# Patient Record
Sex: FEMALE | Race: WHITE | NOT HISPANIC OR LATINO | Employment: FULL TIME | URBAN - METROPOLITAN AREA
[De-identification: names, ages, dates, MRNs, and addresses within clinical notes are randomized per-mention and may not be internally consistent; named-entity substitution may affect disease eponyms.]

---

## 2024-04-01 ENCOUNTER — APPOINTMENT (OUTPATIENT)
Dept: PRIMARY CARE | Facility: CLINIC | Age: 54
End: 2024-04-01
Payer: COMMERCIAL

## 2024-04-21 ASSESSMENT — PROMIS GLOBAL HEALTH SCALE
RATE_QUALITY_OF_LIFE: VERY GOOD
CARRYOUT_PHYSICAL_ACTIVITIES: COMPLETELY
RATE_PHYSICAL_HEALTH: GOOD
RATE_MENTAL_HEALTH: VERY GOOD
RATE_AVERAGE_PAIN: 0
RATE_GENERAL_HEALTH: GOOD
RATE_SOCIAL_SATISFACTION: VERY GOOD
EMOTIONAL_PROBLEMS: NEVER
CARRYOUT_SOCIAL_ACTIVITIES: VERY GOOD

## 2024-04-22 ENCOUNTER — OFFICE VISIT (OUTPATIENT)
Dept: PRIMARY CARE | Facility: CLINIC | Age: 54
End: 2024-04-22
Payer: COMMERCIAL

## 2024-04-22 VITALS
HEART RATE: 88 BPM | HEIGHT: 67 IN | OXYGEN SATURATION: 97 % | DIASTOLIC BLOOD PRESSURE: 62 MMHG | BODY MASS INDEX: 30.29 KG/M2 | WEIGHT: 193 LBS | SYSTOLIC BLOOD PRESSURE: 108 MMHG

## 2024-04-22 DIAGNOSIS — Z13.29 THYROID DISORDER SCREENING: ICD-10-CM

## 2024-04-22 DIAGNOSIS — B35.1 ONYCHOMYCOSIS: ICD-10-CM

## 2024-04-22 DIAGNOSIS — Z11.3 SCREENING EXAMINATION FOR STI: ICD-10-CM

## 2024-04-22 DIAGNOSIS — Z11.59 NEED FOR HEPATITIS C SCREENING TEST: ICD-10-CM

## 2024-04-22 DIAGNOSIS — Z00.00 ANNUAL PHYSICAL EXAM: Primary | ICD-10-CM

## 2024-04-22 DIAGNOSIS — Z13.220 LIPID SCREENING: ICD-10-CM

## 2024-04-22 DIAGNOSIS — T75.3XXA MOTION SICKNESS, INITIAL ENCOUNTER: ICD-10-CM

## 2024-04-22 PROBLEM — G89.29 PAIN, FOOT, CHRONIC: Status: ACTIVE | Noted: 2024-04-22

## 2024-04-22 PROBLEM — E78.00 ELEVATED LOW DENSITY LIPOPROTEIN (LDL) CHOLESTEROL LEVEL: Status: ACTIVE | Noted: 2024-04-22

## 2024-04-22 PROBLEM — R92.30 DENSE BREAST: Status: ACTIVE | Noted: 2018-10-18

## 2024-04-22 PROBLEM — L72.3 SEBACEOUS CYST: Status: ACTIVE | Noted: 2024-04-22

## 2024-04-22 PROBLEM — U07.1 DISEASE DUE TO SEVERE ACUTE RESPIRATORY SYNDROME CORONAVIRUS 2 (SARS-COV-2): Status: ACTIVE | Noted: 2024-04-22

## 2024-04-22 PROBLEM — L91.8 SKIN TAG: Status: ACTIVE | Noted: 2024-04-22

## 2024-04-22 PROBLEM — M79.673 PAIN, FOOT, CHRONIC: Status: ACTIVE | Noted: 2024-04-22

## 2024-04-22 PROBLEM — R31.9 HEMATURIA: Status: RESOLVED | Noted: 2024-04-22 | Resolved: 2024-04-22

## 2024-04-22 PROBLEM — L81.1 CHLOASMA: Status: ACTIVE | Noted: 2024-04-22

## 2024-04-22 PROBLEM — R92.2 DENSE BREAST: Status: ACTIVE | Noted: 2018-10-18

## 2024-04-22 LAB
ALBUMIN SERPL BCP-MCNC: 4.4 G/DL (ref 3.4–5)
ALP SERPL-CCNC: 50 U/L (ref 33–110)
ALT SERPL W P-5'-P-CCNC: 19 U/L (ref 7–45)
ANION GAP SERPL CALC-SCNC: 12 MMOL/L (ref 10–20)
AST SERPL W P-5'-P-CCNC: 21 U/L (ref 9–39)
BILIRUB SERPL-MCNC: 0.4 MG/DL (ref 0–1.2)
BUN SERPL-MCNC: 15 MG/DL (ref 6–23)
CALCIUM SERPL-MCNC: 9.4 MG/DL (ref 8.6–10.6)
CHLORIDE SERPL-SCNC: 104 MMOL/L (ref 98–107)
CHOLEST SERPL-MCNC: 230 MG/DL (ref 0–199)
CHOLESTEROL/HDL RATIO: 2.7
CO2 SERPL-SCNC: 31 MMOL/L (ref 21–32)
CREAT SERPL-MCNC: 0.82 MG/DL (ref 0.5–1.05)
EGFRCR SERPLBLD CKD-EPI 2021: 85 ML/MIN/1.73M*2
ERYTHROCYTE [DISTWIDTH] IN BLOOD BY AUTOMATED COUNT: 13.2 % (ref 11.5–14.5)
EST. AVERAGE GLUCOSE BLD GHB EST-MCNC: 94 MG/DL
GLUCOSE SERPL-MCNC: 77 MG/DL (ref 74–99)
HBA1C MFR BLD: 4.9 %
HCT VFR BLD AUTO: 45.9 % (ref 36–46)
HCV AB SER QL: NONREACTIVE
HDLC SERPL-MCNC: 86.6 MG/DL
HERPES SIMPLEX VIRUS 1 IGG: 0.5 INDEX
HERPES SIMPLEX VIRUS 2 IGG: >8 INDEX
HGB BLD-MCNC: 14.4 G/DL (ref 12–16)
HIV 1+2 AB+HIV1 P24 AG SERPL QL IA: NONREACTIVE
LDLC SERPL CALC-MCNC: 123 MG/DL
MCH RBC QN AUTO: 31.4 PG (ref 26–34)
MCHC RBC AUTO-ENTMCNC: 31.4 G/DL (ref 32–36)
MCV RBC AUTO: 100 FL (ref 80–100)
NON HDL CHOLESTEROL: 143 MG/DL (ref 0–149)
NRBC BLD-RTO: 0 /100 WBCS (ref 0–0)
PLATELET # BLD AUTO: 278 X10*3/UL (ref 150–450)
POTASSIUM SERPL-SCNC: 4.6 MMOL/L (ref 3.5–5.3)
PROT SERPL-MCNC: 6.6 G/DL (ref 6.4–8.2)
RBC # BLD AUTO: 4.58 X10*6/UL (ref 4–5.2)
SODIUM SERPL-SCNC: 142 MMOL/L (ref 136–145)
TREPONEMA PALLIDUM IGG+IGM AB [PRESENCE] IN SERUM OR PLASMA BY IMMUNOASSAY: NONREACTIVE
TRIGL SERPL-MCNC: 102 MG/DL (ref 0–149)
TSH SERPL-ACNC: 2.66 MIU/L (ref 0.44–3.98)
VLDL: 20 MG/DL (ref 0–40)
WBC # BLD AUTO: 4.9 X10*3/UL (ref 4.4–11.3)

## 2024-04-22 PROCEDURE — 86695 HERPES SIMPLEX TYPE 1 TEST: CPT

## 2024-04-22 PROCEDURE — 80061 LIPID PANEL: CPT

## 2024-04-22 PROCEDURE — 85027 COMPLETE CBC AUTOMATED: CPT

## 2024-04-22 PROCEDURE — 87800 DETECT AGNT MULT DNA DIREC: CPT

## 2024-04-22 PROCEDURE — 87491 CHLMYD TRACH DNA AMP PROBE: CPT

## 2024-04-22 PROCEDURE — 80053 COMPREHEN METABOLIC PANEL: CPT

## 2024-04-22 PROCEDURE — 86780 TREPONEMA PALLIDUM: CPT

## 2024-04-22 PROCEDURE — 86696 HERPES SIMPLEX TYPE 2 TEST: CPT

## 2024-04-22 PROCEDURE — 84443 ASSAY THYROID STIM HORMONE: CPT

## 2024-04-22 PROCEDURE — 83036 HEMOGLOBIN GLYCOSYLATED A1C: CPT

## 2024-04-22 PROCEDURE — 87591 N.GONORRHOEAE DNA AMP PROB: CPT

## 2024-04-22 PROCEDURE — 86803 HEPATITIS C AB TEST: CPT

## 2024-04-22 PROCEDURE — 99396 PREV VISIT EST AGE 40-64: CPT | Performed by: STUDENT IN AN ORGANIZED HEALTH CARE EDUCATION/TRAINING PROGRAM

## 2024-04-22 PROCEDURE — 36415 COLL VENOUS BLD VENIPUNCTURE: CPT

## 2024-04-22 PROCEDURE — 4004F PT TOBACCO SCREEN RCVD TLK: CPT | Performed by: STUDENT IN AN ORGANIZED HEALTH CARE EDUCATION/TRAINING PROGRAM

## 2024-04-22 PROCEDURE — 87389 HIV-1 AG W/HIV-1&-2 AB AG IA: CPT

## 2024-04-22 RX ORDER — AMOXICILLIN AND CLAVULANATE POTASSIUM 875; 125 MG/1; MG/1
875 TABLET, FILM COATED ORAL 2 TIMES DAILY
COMMUNITY
Start: 2024-04-16

## 2024-04-22 RX ORDER — TERBINAFINE HYDROCHLORIDE 250 MG/1
250 TABLET ORAL DAILY
Qty: 90 TABLET | Refills: 0 | Status: SHIPPED | OUTPATIENT
Start: 2024-04-22 | End: 2024-07-21

## 2024-04-22 RX ORDER — SCOLOPAMINE TRANSDERMAL SYSTEM 1 MG/1
1 PATCH, EXTENDED RELEASE TRANSDERMAL
Qty: 10 PATCH | Refills: 0 | Status: SHIPPED | OUTPATIENT
Start: 2024-04-22 | End: 2024-06-21

## 2024-04-22 ASSESSMENT — ENCOUNTER SYMPTOMS
SHORTNESS OF BREATH: 0
DIARRHEA: 0
CONSTIPATION: 0
CHEST TIGHTNESS: 0
NERVOUS/ANXIOUS: 0
FATIGUE: 0
LIGHT-HEADEDNESS: 0
GASTROINTESTINAL NEGATIVE: 1
UNEXPECTED WEIGHT CHANGE: 0
ABDOMINAL PAIN: 0
HEADACHES: 0
SLEEP DISTURBANCE: 0
WHEEZING: 0
MUSCULOSKELETAL NEGATIVE: 1
SINUS PRESSURE: 0
DYSPHORIC MOOD: 0
DIFFICULTY URINATING: 0
PALPITATIONS: 0
ACTIVITY CHANGE: 0
DIZZINESS: 0

## 2024-04-22 ASSESSMENT — PATIENT HEALTH QUESTIONNAIRE - PHQ9
1. LITTLE INTEREST OR PLEASURE IN DOING THINGS: NOT AT ALL
2. FEELING DOWN, DEPRESSED OR HOPELESS: NOT AT ALL
SUM OF ALL RESPONSES TO PHQ9 QUESTIONS 1 AND 2: 0

## 2024-04-22 NOTE — PROGRESS NOTES
Subjective   Patient ID: Carmen Villaseñor is a 53 y.o. female who presents for Annual Exam (Annual physical/She thinks she may have toenail fungus on both feet).  Noticing her toenails have been feeling sore sometimes. States she had this previously when she had toe fungus.     Had one episode of menopausal bleeding. Had a follow up pelvc US and endometrial biopsy which was normal.     Colonoscopy 2022. Mammogram UTD.     Interested in STI screening.    Due for screening labs.      Reports smokes an occasional cigarette once a month with her friends.     No other concerns today.             Review of Systems   Constitutional:  Negative for activity change, fatigue and unexpected weight change.   HENT: Negative.  Negative for congestion, ear pain and sinus pressure.    Eyes:  Negative for visual disturbance.   Respiratory:  Negative for chest tightness, shortness of breath and wheezing.    Cardiovascular:  Negative for chest pain, palpitations and leg swelling.   Gastrointestinal: Negative.  Negative for abdominal pain, constipation and diarrhea.   Genitourinary:  Negative for difficulty urinating.   Musculoskeletal: Negative.    Skin:  Negative for rash.   Neurological:  Negative for dizziness, light-headedness and headaches.   Psychiatric/Behavioral:  Negative for dysphoric mood and sleep disturbance. The patient is not nervous/anxious.    All other systems reviewed and are negative.      Objective   Physical Exam  Vitals reviewed.   Constitutional:       General: She is not in acute distress.  HENT:      Head: Normocephalic.      Right Ear: External ear normal.      Left Ear: External ear normal.      Nose: Nose normal.   Eyes:      Extraocular Movements: Extraocular movements intact.      Pupils: Pupils are equal, round, and reactive to light.   Cardiovascular:      Rate and Rhythm: Normal rate and regular rhythm.      Heart sounds: Normal heart sounds.   Pulmonary:      Effort: Pulmonary effort is normal.       Breath sounds: Normal breath sounds.   Abdominal:      Palpations: Abdomen is soft.      Tenderness: There is no abdominal tenderness. There is no guarding.   Musculoskeletal:         General: Normal range of motion.      Cervical back: Normal range of motion and neck supple.   Skin:     General: Skin is warm and dry.      Comments: Toenail thickening on left great toe, right great toe, and right fifth toe   Neurological:      Mental Status: She is alert. Mental status is at baseline.   Psychiatric:         Mood and Affect: Mood normal.         Behavior: Behavior normal.         Body mass index is 30.23 kg/m².      Current Outpatient Medications:     amoxicillin-pot clavulanate (Augmentin) 875-125 mg tablet, Take 1 tablet (875 mg) by mouth 2 times a day., Disp: , Rfl:     scopolamine (Transderm-Scop) 1 mg over 3 days patch 3 day, Place 1 patch over 72 hours on the skin every 3rd day if needed (nausea)., Disp: 10 patch, Rfl: 0    terbinafine (LamISIL) 250 mg tablet, Take 1 tablet (250 mg) by mouth once daily., Disp: 90 tablet, Rfl: 0      Assessment/Plan   Diagnoses and all orders for this visit:  Annual physical exam  Comments:  UTD on mammogram  Orders:  -     Comprehensive Metabolic Panel  -     CBC  -     Hemoglobin A1c  Onychomycosis  -     terbinafine (LamISIL) 250 mg tablet; Take 1 tablet (250 mg) by mouth once daily.  Lipid screening  -     Lipid Panel  Thyroid disorder screening  -     TSH with reflex to Free T4 if abnormal  Need for hepatitis C screening test  -     Hepatitis C Antibody  Screening examination for STI  -     C. trachomatis / N. gonorrhoeae, DNA probe  -     Syphilis Screen with Reflex  -     HIV 1/2 Antigen/Antibody Screen with Reflex to Confirmation  -     HSV1 IgG and HSV2 IgG  Motion sickness, initial encounter  -     scopolamine (Transderm-Scop) 1 mg over 3 days patch 3 day; Place 1 patch over 72 hours on the skin every 3rd day if needed (nausea).       Follow up annually    Suly WHITE  DO Krysta 04/22/24 9:08 AM

## 2024-04-23 LAB
C TRACH RRNA SPEC QL NAA+PROBE: NEGATIVE
N GONORRHOEA DNA SPEC QL PROBE+SIG AMP: NEGATIVE

## 2025-08-25 DIAGNOSIS — Z12.11 SCREENING FOR COLON CANCER: Primary | ICD-10-CM

## 2025-08-25 RX ORDER — SODIUM, POTASSIUM,MAG SULFATES 17.5-3.13G
SOLUTION, RECONSTITUTED, ORAL ORAL
Qty: 354 ML | Refills: 0 | Status: SHIPPED | OUTPATIENT
Start: 2025-08-25